# Patient Record
Sex: MALE | Race: WHITE | Employment: OTHER | URBAN - METROPOLITAN AREA
[De-identification: names, ages, dates, MRNs, and addresses within clinical notes are randomized per-mention and may not be internally consistent; named-entity substitution may affect disease eponyms.]

---

## 2023-02-13 PROBLEM — R06.02 SHORTNESS OF BREATH: Status: ACTIVE | Noted: 2023-02-13

## 2023-02-13 PROBLEM — M25.50 ARTHRALGIA OF MULTIPLE SITES: Status: ACTIVE | Noted: 2023-02-13

## 2023-02-13 PROBLEM — F41.1 GENERALIZED ANXIETY DISORDER: Status: ACTIVE | Noted: 2023-02-13

## 2023-02-13 PROBLEM — K76.0 FATTY INFILTRATION OF LIVER: Status: ACTIVE | Noted: 2023-02-13

## 2023-02-13 PROBLEM — J02.9 PHARYNGITIS: Status: ACTIVE | Noted: 2023-02-13

## 2023-02-13 PROBLEM — R00.2 PALPITATIONS: Status: ACTIVE | Noted: 2023-02-13

## 2023-02-13 PROBLEM — M54.9 BACK PAIN: Status: ACTIVE | Noted: 2023-02-13

## 2023-02-13 PROBLEM — F41.9 ANXIETY: Status: ACTIVE | Noted: 2023-02-13

## 2023-02-13 PROBLEM — R07.89 ATYPICAL CHEST PAIN: Status: ACTIVE | Noted: 2023-02-13

## 2023-02-13 PROBLEM — I47.20 VENTRICULAR TACHYCARDIA (PAROXYSMAL): Status: ACTIVE | Noted: 2023-02-13

## 2023-02-13 PROBLEM — F32.A DEPRESSION: Status: ACTIVE | Noted: 2023-02-13

## 2023-02-13 PROBLEM — R53.83 FATIGUE: Status: ACTIVE | Noted: 2023-02-13

## 2023-02-13 PROBLEM — R51.9 HEADACHE: Status: ACTIVE | Noted: 2023-02-13

## 2023-02-13 PROBLEM — I47.29 VENTRICULAR TACHYCARDIA (PAROXYSMAL) (MULTI): Status: ACTIVE | Noted: 2023-02-13

## 2023-02-13 PROBLEM — R14.0 ABDOMINAL BLOATING: Status: ACTIVE | Noted: 2023-02-13

## 2023-02-13 PROBLEM — K21.9 GERD (GASTROESOPHAGEAL REFLUX DISEASE): Status: ACTIVE | Noted: 2023-02-13

## 2023-02-13 PROBLEM — F32.1 DEPRESSION, MAJOR, SINGLE EPISODE, MODERATE (MULTI): Status: ACTIVE | Noted: 2023-02-13

## 2023-02-13 PROBLEM — R10.9 ABDOMINAL PAIN: Status: ACTIVE | Noted: 2023-02-13

## 2023-02-13 PROBLEM — L03.90 CELLULITIS: Status: ACTIVE | Noted: 2023-02-13

## 2023-02-13 PROBLEM — U07.1 COVID-19 VIRUS INFECTION: Status: ACTIVE | Noted: 2023-02-13

## 2023-02-13 PROBLEM — R91.1 PULMONARY NODULE: Status: ACTIVE | Noted: 2023-02-13

## 2023-02-13 PROBLEM — I51.89 FAMILIAL HEART DISEASE: Status: ACTIVE | Noted: 2023-02-13

## 2023-02-13 PROBLEM — R19.8 ALTERNATING CONSTIPATION AND DIARRHEA: Status: ACTIVE | Noted: 2023-02-13

## 2023-02-13 RX ORDER — VENLAFAXINE HYDROCHLORIDE 150 MG/1
150 CAPSULE, EXTENDED RELEASE ORAL DAILY
COMMUNITY
Start: 2022-07-07 | End: 2023-07-19

## 2023-02-13 RX ORDER — METOPROLOL TARTRATE 50 MG/1
50 TABLET ORAL 2 TIMES DAILY
COMMUNITY
Start: 2017-09-22 | End: 2023-10-26

## 2023-02-13 RX ORDER — BUSPIRONE HYDROCHLORIDE 15 MG/1
15 TABLET ORAL 2 TIMES DAILY
COMMUNITY
End: 2023-04-14

## 2023-03-24 ENCOUNTER — APPOINTMENT (OUTPATIENT)
Dept: PRIMARY CARE | Facility: CLINIC | Age: 53
End: 2023-03-24
Payer: COMMERCIAL

## 2023-04-14 DIAGNOSIS — F41.9 ANXIETY DISORDER, UNSPECIFIED: ICD-10-CM

## 2023-04-14 RX ORDER — BUSPIRONE HYDROCHLORIDE 15 MG/1
TABLET ORAL
Qty: 180 TABLET | Refills: 3 | Status: SHIPPED | OUTPATIENT
Start: 2023-04-14 | End: 2023-11-01 | Stop reason: ALTCHOICE

## 2023-07-19 DIAGNOSIS — F32.1 MAJOR DEPRESSIVE DISORDER, SINGLE EPISODE, MODERATE (MULTI): ICD-10-CM

## 2023-07-19 RX ORDER — VENLAFAXINE HYDROCHLORIDE 75 MG/1
75 CAPSULE, EXTENDED RELEASE ORAL DAILY
Qty: 90 CAPSULE | Refills: 3 | Status: SHIPPED | OUTPATIENT
Start: 2023-07-19 | End: 2024-04-22

## 2023-07-19 RX ORDER — VENLAFAXINE HYDROCHLORIDE 150 MG/1
CAPSULE, EXTENDED RELEASE ORAL
Qty: 90 CAPSULE | Refills: 3 | Status: SHIPPED | OUTPATIENT
Start: 2023-07-19 | End: 2023-07-19 | Stop reason: ALTCHOICE

## 2023-07-19 RX ORDER — VENLAFAXINE HYDROCHLORIDE 75 MG/1
75 CAPSULE, EXTENDED RELEASE ORAL DAILY
COMMUNITY
End: 2023-07-19 | Stop reason: SDUPTHER

## 2023-09-19 RX ORDER — NEOMYCIN SULFATE, POLYMYXIN B SULFATE AND DEXAMETHASONE 3.5; 10000; 1 MG/ML; [USP'U]/ML; MG/ML
SUSPENSION/ DROPS OPHTHALMIC
COMMUNITY
Start: 2023-06-01 | End: 2023-11-01 | Stop reason: ALTCHOICE

## 2023-10-26 DIAGNOSIS — R00.2 PALPITATIONS: ICD-10-CM

## 2023-10-26 RX ORDER — METOPROLOL TARTRATE 50 MG/1
TABLET ORAL
Qty: 270 TABLET | Refills: 3 | Status: SHIPPED | OUTPATIENT
Start: 2023-10-26 | End: 2024-05-08 | Stop reason: ALTCHOICE

## 2023-11-01 ENCOUNTER — OFFICE VISIT (OUTPATIENT)
Dept: CARDIOLOGY | Facility: CLINIC | Age: 53
End: 2023-11-01
Payer: COMMERCIAL

## 2023-11-01 VITALS
HEIGHT: 70 IN | HEART RATE: 66 BPM | RESPIRATION RATE: 16 BRPM | BODY MASS INDEX: 32.07 KG/M2 | DIASTOLIC BLOOD PRESSURE: 84 MMHG | OXYGEN SATURATION: 97 % | SYSTOLIC BLOOD PRESSURE: 116 MMHG | WEIGHT: 224 LBS

## 2023-11-01 DIAGNOSIS — R00.2 PALPITATIONS: Primary | ICD-10-CM

## 2023-11-01 DIAGNOSIS — I47.29 VENTRICULAR TACHYCARDIA (PAROXYSMAL) (MULTI): ICD-10-CM

## 2023-11-01 DIAGNOSIS — I51.89 FAMILIAL HEART DISEASE: ICD-10-CM

## 2023-11-01 PROCEDURE — 99213 OFFICE O/P EST LOW 20 MIN: CPT | Performed by: INTERNAL MEDICINE

## 2023-11-01 PROCEDURE — 93005 ELECTROCARDIOGRAM TRACING: CPT | Performed by: INTERNAL MEDICINE

## 2023-11-01 PROCEDURE — 93010 ELECTROCARDIOGRAM REPORT: CPT | Performed by: INTERNAL MEDICINE

## 2023-11-01 PROCEDURE — 1036F TOBACCO NON-USER: CPT | Performed by: INTERNAL MEDICINE

## 2023-11-01 ASSESSMENT — PATIENT HEALTH QUESTIONNAIRE - PHQ9
1. LITTLE INTEREST OR PLEASURE IN DOING THINGS: NOT AT ALL
SUM OF ALL RESPONSES TO PHQ9 QUESTIONS 1 AND 2: 0
2. FEELING DOWN, DEPRESSED OR HOPELESS: NOT AT ALL

## 2023-11-01 ASSESSMENT — COLUMBIA-SUICIDE SEVERITY RATING SCALE - C-SSRS
1. IN THE PAST MONTH, HAVE YOU WISHED YOU WERE DEAD OR WISHED YOU COULD GO TO SLEEP AND NOT WAKE UP?: NO
6. HAVE YOU EVER DONE ANYTHING, STARTED TO DO ANYTHING, OR PREPARED TO DO ANYTHING TO END YOUR LIFE?: NO
2. HAVE YOU ACTUALLY HAD ANY THOUGHTS OF KILLING YOURSELF?: NO

## 2023-11-01 NOTE — PROGRESS NOTES
Subjective   Rj Mejia was last seen in the [facility] for follow up of palpitations and paroxysmal ventricular tachycardia in April 2023.      Since our last visit, Mr. Mejia has only stable frequency of palpitations. Short episodes almost daily at night controlled with using second metoprolol 50 mg tablet as needed at night. Rare episodes of palpitations that last for 1-5 seconds from paroxysmal VT episodes about 1 per 2 months. He is taking metoprolol as 50 mg AM and 100 mg PM. Episodes occur mostly at night and are better suppressed with 100 mg in the evening. Palpitation episodes not limited activity or daily living routine.  mg doses cause notable dyspnea. Notes more day time fatigue now and decreased exercise schedule.     Prior symptom baseline at 4/2021 visit was symptoms 3-4 times a week. No lightheadedness or presyncope. No chest pain, PND, orthopnea, DORENE, claudication. Some episodes of dyspnea with walking up the stairs. Less aerobic exercise frequency in the last 6 months. Has not had episodes of orthostatic lightheadedness with standing in the last 6 months with more oral hydration.     He was hospitalized at [facility] 9/19-9/22/2017 for finding of sustained ventricular tachycardia and 6 second sinus pause on ZIoPatch monitor. Structural heart reason for VT excluded by CMRI, CCTA.      Past Medical History:  1. Depression on SSRI  2. Palpitations / paroxysmal VT     Family History:  Father had CABG in his 60s and AVR for bicuspid AV     Social History:  Non-smoker     Review of Systems    A 14 point review of systems was asked. All questions were negative except for pertinent positives listed in the HPI.      Objective   Physical Exam  BP Readings from Last 3 Encounters:   11/01/23 116/84   04/26/23 107/72   10/12/22 108/73      Wt Readings from Last 3 Encounters:   11/01/23 102 kg (224 lb)   04/26/23 99.7 kg (219 lb 12.8 oz)   12/22/22 93 kg (205 lb)      BMI:  "Estimated body mass index is 32.14 kg/m² as calculated from the following:    Height as of this encounter: 1.778 m (5' 10\").    Weight as of this encounter: 102 kg (224 lb).  BSA: Estimated body surface area is 2.24 meters squared as calculated from the following:    Height as of this encounter: 1.778 m (5' 10\").    Weight as of this encounter: 102 kg (224 lb).    General: no acute distress  HEENT: EOMI, no scleral icterus.  Lungs: Clear to auscultation bilaterally without wheezing, rales, or rhonchi.  Cardiovascular: Regular rhythm and rate. Normal S1 and S2. No murmurs, rubs, or gallops are appreciated. JVP normal.  Abdomen: Soft, nontender, nondistended. Bowel sounds present.  Extremities: Warm and well perfused with equal 2+ pulses bilaterally.  No edema present.  Neurologic: Alert and oriented x3.    I have personally reviewed the following images and laboratory findings:  Last echocardiogram: 2023 echo: LV 65-70%, LV conc remodeling (LVMI 83 gm/m2), impaired relaxation diastology (E/e' 6), normal LA size (SPENCER 21 ml/m2), normal RV/RA, trivial AI, trace MR, trace TR, RVSP 20 mm Hg (RAP 3 mm Hg).    Prior echo, 2017: LV EF 65-70%, no LVH, impaired relaxation diastology (E/e' 4), normal LA (SPENCER 20), normal RV/RA, no AI, mild MAC, trace MR, trace TR, unable to estimate RVSP.     Cardiac MRI, 2017: LV EF 55-60%, normal RV function and morphology with LGE enhancement or fatty infiltration. Normal LV ECV and no LGE.     Last cath / stress test: Coronary CTA 2017: LMCA: nl, LAD: focal mid mild plaque, LCX: nl, RI: nl, RCA: dominant, normal.    Most recent EC2023 ECG: Sinus bradycardia, 58 bpm, borderline LVH pattern. Normal variant ECG. Personally reviewed in office.     Assessment/Plan   1. Palpitations:  Due to PVCs and rare paroxysmal VT without evidence of structural heart disease on detail evaluation in 2017. Continue metoprolol 50 mg AM and cut back to 50 mg PM mg PM to see if this " helps fatigue. Can take PRN PM 50 mg 2nd table for palpitation suppression as this is helping his symptomatology now. 2022 repeat 7 day HR monitor showed stable PVC burden of 0.5% with rate short NSVT episodes controlled on beta-blocker therapy.     For recent exertional dyspnea episodes and ongoing palpitations from PVCs/paroxysmal VT, repeat echocardiogram in May 2023 showed no cardiac structural changes and was unchanged from the 2017 study.      2. Family History of CAD:  Low CACS (29), and patent coronary arteries on 9/21/2017 CCTA. No indication for statin therapy at this time. March 2021 lipid panel showed decrease in LDL to 95 from 131 in 2019. January 2023 LDL stable at 96. Long term LDL goal < 100 based on current risk factor profile.     Follow up with Dr. Cruz in 6 months.            SIGNATURE: Wallace Cruz MD PATIENT NAME: Rj Mejia   DATE/TIME: November 1, 2023 10:53 AM MRN: [id number]

## 2023-11-01 NOTE — PATIENT INSTRUCTIONS
You were seen in the [facility] for your palpitations and ventricular tachycardia.     For your shortness of breath with activity such as climbing the stairs and ongoing palpitations from PVCs,    Continue your metoprolol as 50 mg in the morning and cut back to 50 mg (1 50 mg tablet) at night.     We will see if this helps your fatigue feeling. I would also take your Effexor-XR at night.     You can take an extra dose of metoprolol 50 mg at night for breakthrough palpitation episodes.     Your April-May 2022 repeat the 7 day heart rate monitor showed that PVCs were 0.5% of your total heart beats and that you had 5 episodes of 5-20 beats of the ventricular tachycardia which were similar in frequency of extra heart beats and compare to your prior 2019 monitor. Your May 2023 Echocardiogram (ultrasound of the heart) showed normal heart structure and function with no changes from 2017.     Your April 2019 heart rate monitor (Life MetricsoPatch) showed only very rare extra heart beats from the ventricles (< 1% total heart beats were PVCs) with 1 episode of 8 consecutive PVCs together. Average resting heart rate was 67 bpm. This monitor supports us continuing your current metoprolol dose.     Your September 2017 coronary CT showed no significant hardening of the heart arteries. We do not need to start a cholesterol medicine at this time. Your January 2023 LDL (bad) cholesterol was 96 and is down from 131 in 2019. Based on your current risk factors, your long term LDL goal is < 100. We do not need to use cholesterol medications at this time.      Drink 2 liters of fluids a day to prevent lightheadedness with standing. Drink 16-20 ounces first in the morning. Keep a large glass of water at your bedside.     Follow up with Dr. Cruz in 6 months.

## 2023-11-10 LAB
ATRIAL RATE: 58 BPM
P AXIS: 15 DEGREES
P OFFSET: 194 MS
P ONSET: 140 MS
PR INTERVAL: 172 MS
Q ONSET: 226 MS
QRS COUNT: 10 BEATS
QRS DURATION: 88 MS
QT INTERVAL: 416 MS
QTC CALCULATION(BAZETT): 408 MS
QTC FREDERICIA: 411 MS
R AXIS: -20 DEGREES
T AXIS: -3 DEGREES
T OFFSET: 434 MS
VENTRICULAR RATE: 58 BPM

## 2024-02-14 ENCOUNTER — TELEMEDICINE (OUTPATIENT)
Dept: PRIMARY CARE | Facility: CLINIC | Age: 54
End: 2024-02-14
Payer: COMMERCIAL

## 2024-02-14 DIAGNOSIS — U07.1 COVID-19: Primary | ICD-10-CM

## 2024-02-14 DIAGNOSIS — Z00.00 HEALTHCARE MAINTENANCE: ICD-10-CM

## 2024-02-14 PROCEDURE — 99213 OFFICE O/P EST LOW 20 MIN: CPT | Performed by: STUDENT IN AN ORGANIZED HEALTH CARE EDUCATION/TRAINING PROGRAM

## 2024-02-14 PROCEDURE — 1036F TOBACCO NON-USER: CPT | Performed by: STUDENT IN AN ORGANIZED HEALTH CARE EDUCATION/TRAINING PROGRAM

## 2024-02-14 ASSESSMENT — ENCOUNTER SYMPTOMS
FEVER: 0
RHINORRHEA: 1
SHORTNESS OF BREATH: 0
WEAKNESS: 0
LIGHT-HEADEDNESS: 1
SINUS PAIN: 0
CHILLS: 1
SORE THROAT: 0
SINUS PRESSURE: 1
VOMITING: 0
DIAPHORESIS: 1
ABDOMINAL PAIN: 0
WHEEZING: 0
NUMBNESS: 0
NAUSEA: 0
DIZZINESS: 0
DIARRHEA: 0

## 2024-02-14 NOTE — PROGRESS NOTES
Subjective   Patient ID: Rj Mejia is a 53 y.o. male who presents for covid.  Virtual or Telephone Consent    An interactive audio and video telecommunication system which permits real time communications between the patient (at the originating site) and provider (at the distant site) was utilized to provide this telehealth service.   Verbal consent was requested and obtained from Rj Mejia on this date, 02/14/24 for a telehealth visit.     Pt started having symptoms on Monday and tested positive for covid yesterday. Pt has sx of maxillary sinus congestion, cough-initially was dry cough, minimal mucus production now, headache on Monday but intermittent and better with aleve, and body aches. Takes aleve intermittently PRN. Also using nyquil. He reports drinking lots of fluids.     PMH is significant for BMI 32, 30 liver, palpitations, anxiety/depression.  GFR 88 in January 2023.        Review of Systems   Constitutional:  Positive for chills (resolved) and diaphoresis (better). Negative for fever.   HENT:  Positive for postnasal drip, rhinorrhea and sinus pressure. Negative for ear discharge, ear pain, hearing loss, sinus pain and sore throat.    Eyes:  Negative for visual disturbance.   Respiratory:  Negative for shortness of breath and wheezing.    Cardiovascular:  Negative for chest pain.   Gastrointestinal:  Negative for abdominal pain, diarrhea, nausea and vomiting.   Neurological:  Positive for light-headedness. Negative for dizziness, syncope, weakness and numbness.       There were no vitals taken for this visit.    Objective   Physical Exam  Constitutional:       General: He is not in acute distress.  Pulmonary:      Effort: No respiratory distress.   Neurological:      Mental Status: He is alert.         Assessment/Plan   Problem List Items Addressed This Visit       COVID-19 - Primary    Relevant Medications    nirmatrelvir-ritonavir (PAXLOVID) 300 mg (150 mg x 2)-100 mg tablet therapy pack      Other Visit Diagnoses       Healthcare maintenance        Relevant Orders    Follow Up In [facility] - PCP - Health Maintenance              COVID-19  - Symptoms largely consistent with COVID-19 given symptoms and positive COVID test.  -Discussed he is at higher risk of complications from COVID including BMI elevation, anxiety/depression, and would likely benefit from Paxlovid.  Discussed goal would be to keep him out of the hospital.  Discussed common side effects of Paxlovid including altered taste and diarrhea and rare chance of side effects such as severe skin reactions, allergic reactions, syncope, cardiac side effects.   -Discussed to try Flonase 1 to 2 sprays in each nostril for 2 weeks to help with sinus symptoms, rare chance of nosebleeds and let us know if these develop.  Also advised the followin).  Watch breathing closely- if shortness of breath develops or pulse ox drops <90%, need to go to the emergency room  2).  Push fluids and get plenty of rest  3).  Can use Tylenol as needed for body aches / fevers  4).  Should quarantine from others for 10 days from start of symptoms or after 5 days and afebrile can leave quarantine with a mask  5).  No need to retest after 10 days are up    Follow-up with me as needed  Follow-up with Dr. Barragan for physical at earliest convenience.

## 2024-03-12 ENCOUNTER — TELEPHONE (OUTPATIENT)
Dept: PRIMARY CARE | Facility: CLINIC | Age: 54
End: 2024-03-12
Payer: COMMERCIAL

## 2024-03-15 ENCOUNTER — TELEPHONE (OUTPATIENT)
Dept: PRIMARY CARE | Facility: CLINIC | Age: 54
End: 2024-03-15
Payer: COMMERCIAL

## 2024-03-15 NOTE — TELEPHONE ENCOUNTER
----- Message from Briana Kimble sent at 3/11/2024  3:31 PM EDT -----  Can you call patient to schedule please  ----- Message -----  From: Lorenzo Bustillos DO  Sent: 2/14/2024   1:28 PM EDT  To:  [id number] [place] [id number] Clerical    Please call patient to set up physical with Dr. Barragan at earliest convenience.

## 2024-04-21 DIAGNOSIS — F32.1 MAJOR DEPRESSIVE DISORDER, SINGLE EPISODE, MODERATE (MULTI): ICD-10-CM

## 2024-04-22 RX ORDER — VENLAFAXINE HYDROCHLORIDE 75 MG/1
75 CAPSULE, EXTENDED RELEASE ORAL DAILY
Qty: 90 CAPSULE | Refills: 3 | Status: SHIPPED | OUTPATIENT
Start: 2024-04-22

## 2024-05-08 ENCOUNTER — OFFICE VISIT (OUTPATIENT)
Dept: CARDIOLOGY | Facility: CLINIC | Age: 54
End: 2024-05-08
Payer: COMMERCIAL

## 2024-05-08 VITALS
WEIGHT: 221 LBS | HEART RATE: 63 BPM | SYSTOLIC BLOOD PRESSURE: 104 MMHG | HEIGHT: 69 IN | BODY MASS INDEX: 32.73 KG/M2 | OXYGEN SATURATION: 96 % | DIASTOLIC BLOOD PRESSURE: 76 MMHG

## 2024-05-08 DIAGNOSIS — I51.89 FAMILIAL HEART DISEASE: ICD-10-CM

## 2024-05-08 DIAGNOSIS — R00.2 PALPITATIONS: Primary | ICD-10-CM

## 2024-05-08 DIAGNOSIS — I47.29 VENTRICULAR TACHYCARDIA (PAROXYSMAL) (MULTI): ICD-10-CM

## 2024-05-08 LAB
ATRIAL RATE: 63 BPM
P AXIS: 46 DEGREES
P OFFSET: 199 MS
P ONSET: 140 MS
PR INTERVAL: 168 MS
Q ONSET: 224 MS
QRS COUNT: 10 BEATS
QRS DURATION: 90 MS
QT INTERVAL: 416 MS
QTC CALCULATION(BAZETT): 425 MS
QTC FREDERICIA: 422 MS
R AXIS: -1 DEGREES
T AXIS: 8 DEGREES
T OFFSET: 432 MS
VENTRICULAR RATE: 63 BPM

## 2024-05-08 PROCEDURE — 99214 OFFICE O/P EST MOD 30 MIN: CPT | Performed by: INTERNAL MEDICINE

## 2024-05-08 PROCEDURE — 93010 ELECTROCARDIOGRAM REPORT: CPT | Performed by: INTERNAL MEDICINE

## 2024-05-08 PROCEDURE — 1036F TOBACCO NON-USER: CPT | Performed by: INTERNAL MEDICINE

## 2024-05-08 PROCEDURE — 93005 ELECTROCARDIOGRAM TRACING: CPT | Performed by: INTERNAL MEDICINE

## 2024-05-08 RX ORDER — METOPROLOL SUCCINATE 50 MG/1
50 TABLET, EXTENDED RELEASE ORAL DAILY
Qty: 90 TABLET | Refills: 3 | Status: SHIPPED | OUTPATIENT
Start: 2024-05-08 | End: 2025-05-08

## 2024-05-08 ASSESSMENT — ENCOUNTER SYMPTOMS
OCCASIONAL FEELINGS OF UNSTEADINESS: 0
LOSS OF SENSATION IN FEET: 0
DEPRESSION: 0

## 2024-05-08 ASSESSMENT — PAIN SCALES - GENERAL: PAINLEVEL: 0-NO PAIN

## 2024-05-08 NOTE — PROGRESS NOTES
Subjective   Rj Mejia was last seen in the [facility] for follow up of palpitations and paroxysmal ventricular tachycardia in November 2023.      Since our last visit, Mr. Mejia has stable frequency of palpitations. Short episodes a few days a week at night. Not using extra 50 mg tablet at night in the last 3 months. Notes increase daytime fatigue.     Rare episodes of palpitations that last for 1-5 seconds from paroxysmal VT episodes about 1 per 2 months. Prior use of metoprolol  mg doses caused notable dyspnea. Notes more day time fatigue now and decreased exercise schedule.     Prior symptom baseline at 4/2021 visit was symptoms 3-4 times a week. No lightheadedness or presyncope. No chest pain, PND, orthopnea, DORENE, claudication. Some episodes of dyspnea with walking up the stairs. Less aerobic exercise frequency in the last 6 months. Has not had episodes of orthostatic lightheadedness with standing in the last 6 months with more oral hydration.     He was hospitalized at [facility] 9/19-9/22/2017 for finding of sustained ventricular tachycardia and 6 second sinus pause on ZIoPatch monitor. Structural heart reason for VT excluded by CMRI, CCTA.      Past Medical History:  1. Depression on SSRI  2. Palpitations / paroxysmal VT     Family History:  Father had CABG in his 60s and AVR for bicuspid AV     Social History:  Non-smoker     Review of Systems    A 14 point review of systems was asked. All questions were negative except for pertinent positives listed in the HPI.     Current Outpatient Medications on File Prior to Visit   Medication Sig Dispense Refill    metoprolol tartrate (Lopressor) 50 mg tablet TAKE 1 TABLET TWICE DAILY YOU MAY ALSO TAKE AN ADDITIONAL 50 MG TABLET DOSE AS NEEDED FOR BREAKTHROUGH PALPITATION SYMTOMS. 270 tablet 3    venlafaxine XR (Effexor-XR) 75 mg 24 hr capsule TAKE 1 CAPSULE BY MOUTH EVERY DAY 90 capsule 3     No current facility-administered  "medications on file prior to visit.      Objective   Physical Exam  BP Readings from Last 3 Encounters:   24 104/76   23 116/84   23 107/72      Wt Readings from Last 3 Encounters:   24 100 kg (221 lb)   23 102 kg (224 lb)   23 99.7 kg (219 lb 12.8 oz)      BMI: Estimated body mass index is 32.64 kg/m² as calculated from the following:    Height as of this encounter: 1.753 m (5' 9\").    Weight as of this encounter: 100 kg (221 lb).  BSA: Estimated body surface area is 2.21 meters squared as calculated from the following:    Height as of this encounter: 1.753 m (5' 9\").    Weight as of this encounter: 100 kg (221 lb).    General: no acute distress  HEENT: EOMI, no scleral icterus.  Lungs: Clear to auscultation bilaterally without wheezing, rales, or rhonchi.  Cardiovascular: Regular rhythm and rate. Normal S1 and S2. No murmurs, rubs, or gallops are appreciated. JVP normal.  Abdomen: Soft, nontender, nondistended. Bowel sounds present.  Extremities: Warm and well perfused with equal 2+ pulses bilaterally.  No edema present.  Neurologic: Alert and oriented x3.    I have personally reviewed the following images and laboratory findings:  Last echocardiogram:   2023 echo: LV 65-70%, LV conc remodeling (LVMI 83 gm/m2), impaired relaxation diastology (E/e' 6), normal LA size (SPENCER 21 ml/m2), normal RV/RA, trivial AI, trace MR, trace TR, RVSP 20 mm Hg (RAP 3 mm Hg).    Prior echo, 2017: LV EF 65-70%, no LVH, impaired relaxation diastology (E/e' 4), normal LA (SPENCER 20), normal RV/RA, no AI, mild MAC, trace MR, trace TR, unable to estimate RVSP.     Cardiac MRI, 2017: LV EF 55-60%, normal RV function and morphology with LGE enhancement or fatty infiltration. Normal LV ECV and no LGE.     Last cath / stress test:   Coronary CTA 2017: LMCA: nl, LAD: focal mid mild plaque, LCX: nl, RI: nl, RCA: dominant, normal.    Most recent EC2024 ECG: Sinus rhythm, 63 bpm, " "normal ECG. Personally reviewed in office.    11/1/2023 ECG: Sinus bradycardia, 58 bpm, borderline LVH pattern. Normal variant ECG. Personally reviewed in office.    Lab Results   Component Value Date    CHOL 170 01/18/2023    CHOL 158 03/15/2022    CHOL 169 03/15/2021     Lab Results   Component Value Date    HDL 38.0 (A) 01/18/2023    HDL 38.0 (A) 03/15/2022    HDL 40.0 03/15/2021     No results found for: \"LDLCALC\"  Lab Results   Component Value Date    TRIG 182 (H) 01/18/2023    TRIG 122 03/15/2022    TRIG 170 (H) 03/15/2021     No components found for: \"CHOLHDL\"      Assessment/Plan   1. Palpitations:  Due to PVCs and rare paroxysmal VT without evidence of structural heart disease on detail evaluation in 2017. 2022 repeat 7 day HR monitor showed stable PVC burden of 0.5% with rate short NSVT episodes controlled on beta-blocker therapy.     For fatigue / exertional dyspnea episodes and ongoing palpitations from PVCs/paroxysmal VT, repeat echocardiogram in May 2023 showed no cardiac structural changes and was unchanged from the 2017 study.     I will switch metoprolol to extended release 50 mg tablets once a day to be taken at night. He can use his current metoprolol tartrate 50 mg tablets for PRN for breakthrough palpitations to see if this reduced daily dose improve dyspnea / fatigue without increasing palpitations.     2. Family History of CAD:  Low CACS (29), and patent coronary arteries on 9/21/2017 CCTA. No indication for statin therapy at this time. March 2021 lipid panel showed decrease in LDL to 95 from 131 in 2019. January 2023 LDL stable at 96. Long term LDL goal < 100 based on current risk factor profile.     Follow up with Dr. Cruz in 6 months.            SIGNATURE: Wallace Cruz MD PATIENT NAME: Rj Mejia   DATE/TIME: May 8, 2024 9:47 AM MRN: [id number]     "

## 2024-05-08 NOTE — PATIENT INSTRUCTIONS
You were seen in the [facility] for your palpitations and ventricular tachycardia.     I am switching your metoprolol tablets to metoprolol ER (extended release) 50 mg once a day. I recommend you take at night.     We will see if this helps your fatigue feeling. You can take an extra dose of metoprolol tartrate 50 mg tablets you have now for breakthrough palpitations.      Your April-May 2022 repeat the 7 day heart rate monitor showed that PVCs were 0.5% of your total heart beats and that you had 5 episodes of 5-20 beats of the ventricular tachycardia which were similar in frequency of extra heart beats and compare to your prior 2019 monitor. Your May 2023 Echocardiogram (ultrasound of the heart) showed normal heart structure and function with no changes from 2017.     Your April 2019 heart rate monitor ([employer]Patch) showed only very rare extra heart beats from the ventricles (< 1% total heart beats were PVCs) with 1 episode of 8 consecutive PVCs together. Average resting heart rate was 67 bpm. This monitor supports us continuing your current metoprolol dose.     Your September 2017 coronary CT showed no significant hardening of the heart arteries. We do not need to start a cholesterol medicine at this time. Your January 2023 LDL (bad) cholesterol was 96 and is down from 131 in 2019. Based on your current risk factors, your long term LDL goal is < 100. We do not need to use cholesterol medications at this time.     Your 2023 Echocardiogram (ultrasound of the heart) showed normal heart structure and function with no changes from 2017.     Drink 2 liters of fluids a day to prevent lightheadedness with standing. Drink 16-20 ounces first in the morning. Keep a large glass of water at your bedside.     Follow up with Dr. Cruz in 6 months.

## 2024-06-20 ENCOUNTER — TELEPHONE (OUTPATIENT)
Dept: CARDIOLOGY | Facility: CLINIC | Age: 54
End: 2024-06-20
Payer: COMMERCIAL

## 2024-06-20 DIAGNOSIS — R00.2 PALPITATIONS: ICD-10-CM

## 2024-06-20 DIAGNOSIS — I47.29 VENTRICULAR TACHYCARDIA (PAROXYSMAL) (MULTI): ICD-10-CM

## 2024-06-20 RX ORDER — METOPROLOL SUCCINATE 100 MG/1
100 TABLET, EXTENDED RELEASE ORAL DAILY
Qty: 90 TABLET | Refills: 3 | Status: SHIPPED | OUTPATIENT
Start: 2024-06-20 | End: 2024-06-21 | Stop reason: SDUPTHER

## 2024-06-20 NOTE — TELEPHONE ENCOUNTER
"Patient contacted cardiology nursing office regarding concern of medication.  Patient provided update Dr Cruz switched patient from metoprolol tartrate to metoprolol succinate due to patient feeling tired.  Patient reports that after the medication switch patient does not feel as tired but is concerned that the medication may not be controlling \"the heart beat\" due to feeling extra beats.  Patient does not have a device to check heart rate so unable to report heart rates.  Patient requesting increase to medication dose due to symptoms. Preferred pharmacy confirmed as [facility] [address] in [place].   "

## 2024-06-21 DIAGNOSIS — R00.2 PALPITATIONS: ICD-10-CM

## 2024-06-21 DIAGNOSIS — I47.29 VENTRICULAR TACHYCARDIA (PAROXYSMAL) (MULTI): ICD-10-CM

## 2024-06-21 RX ORDER — METOPROLOL SUCCINATE 100 MG/1
100 TABLET, EXTENDED RELEASE ORAL DAILY
Qty: 90 TABLET | Refills: 3 | Status: SHIPPED | OUTPATIENT
Start: 2024-06-21 | End: 2025-06-21

## 2024-06-21 RX ORDER — METOPROLOL SUCCINATE 100 MG/1
100 TABLET, EXTENDED RELEASE ORAL DAILY
Qty: 90 TABLET | Refills: 3 | Status: CANCELLED | OUTPATIENT
Start: 2024-06-21 | End: 2025-06-21

## 2024-07-22 ENCOUNTER — TELEPHONE (OUTPATIENT)
Dept: CARDIOLOGY | Facility: CLINIC | Age: 54
End: 2024-07-22
Payer: COMMERCIAL

## 2024-07-22 NOTE — TELEPHONE ENCOUNTER
Patient called to state that his Extended release of Metoprolol didn't seem to work for him. About a week ago he changed back to his original dose of Metoprolol IC tartrate 50 mg tablet 2-3 x a day.  It has seemed to help with his palpitations.     He would like to know whether he should increase his dose and or frequency of the tartrate?

## 2024-07-31 DIAGNOSIS — R00.2 PALPITATIONS: Primary | ICD-10-CM

## 2024-07-31 DIAGNOSIS — I47.29 VENTRICULAR TACHYCARDIA (PAROXYSMAL) (MULTI): ICD-10-CM

## 2024-07-31 RX ORDER — METOPROLOL TARTRATE 50 MG/1
50 TABLET ORAL 2 TIMES DAILY
Qty: 270 TABLET | Refills: 3 | Status: SHIPPED | OUTPATIENT
Start: 2024-07-31 | End: 2025-07-31

## 2024-11-06 ENCOUNTER — OFFICE VISIT (OUTPATIENT)
Dept: CARDIOLOGY | Facility: CLINIC | Age: 54
End: 2024-11-06
Payer: COMMERCIAL

## 2024-11-06 ENCOUNTER — LAB (OUTPATIENT)
Dept: LAB | Facility: LAB | Age: 54
End: 2024-11-06
Payer: COMMERCIAL

## 2024-11-06 VITALS
DIASTOLIC BLOOD PRESSURE: 79 MMHG | HEART RATE: 65 BPM | OXYGEN SATURATION: 94 % | WEIGHT: 219 LBS | HEIGHT: 69 IN | SYSTOLIC BLOOD PRESSURE: 111 MMHG | BODY MASS INDEX: 32.44 KG/M2

## 2024-11-06 DIAGNOSIS — I51.89 FAMILIAL HEART DISEASE: ICD-10-CM

## 2024-11-06 DIAGNOSIS — R00.2 PALPITATIONS: Primary | ICD-10-CM

## 2024-11-06 LAB
CHOLEST SERPL-MCNC: 199 MG/DL (ref 0–199)
CHOLESTEROL/HDL RATIO: 5
HDLC SERPL-MCNC: 39.9 MG/DL
LDLC SERPL CALC-MCNC: 112 MG/DL
NON HDL CHOLESTEROL: 159 MG/DL (ref 0–149)
TRIGL SERPL-MCNC: 234 MG/DL (ref 0–149)
VLDL: 47 MG/DL (ref 0–40)

## 2024-11-06 PROCEDURE — 99213 OFFICE O/P EST LOW 20 MIN: CPT | Performed by: INTERNAL MEDICINE

## 2024-11-06 PROCEDURE — 80061 LIPID PANEL: CPT

## 2024-11-06 PROCEDURE — 3008F BODY MASS INDEX DOCD: CPT | Performed by: INTERNAL MEDICINE

## 2024-11-06 PROCEDURE — 1036F TOBACCO NON-USER: CPT | Performed by: INTERNAL MEDICINE

## 2024-11-06 PROCEDURE — 82172 ASSAY OF APOLIPOPROTEIN: CPT

## 2024-11-06 PROCEDURE — 36415 COLL VENOUS BLD VENIPUNCTURE: CPT

## 2024-11-06 ASSESSMENT — PAIN SCALES - GENERAL: PAINLEVEL_OUTOF10: 0-NO PAIN

## 2024-11-06 ASSESSMENT — COLUMBIA-SUICIDE SEVERITY RATING SCALE - C-SSRS
2. HAVE YOU ACTUALLY HAD ANY THOUGHTS OF KILLING YOURSELF?: NO
1. IN THE PAST MONTH, HAVE YOU WISHED YOU WERE DEAD OR WISHED YOU COULD GO TO SLEEP AND NOT WAKE UP?: NO
6. HAVE YOU EVER DONE ANYTHING, STARTED TO DO ANYTHING, OR PREPARED TO DO ANYTHING TO END YOUR LIFE?: NO

## 2024-11-06 ASSESSMENT — PATIENT HEALTH QUESTIONNAIRE - PHQ9
1. LITTLE INTEREST OR PLEASURE IN DOING THINGS: NOT AT ALL
2. FEELING DOWN, DEPRESSED OR HOPELESS: NOT AT ALL
SUM OF ALL RESPONSES TO PHQ9 QUESTIONS 1 AND 2: 0

## 2024-11-06 NOTE — PROGRESS NOTES
Subjective   Rj Mejia was last seen in the [facility] for follow up of palpitations and paroxysmal ventricular tachycardia in May 2024.      Since our last visit, Mr. Mejia has stable frequency of palpitations back on metoprolol tartrate 50 mg twice a day. Short episodes a few days a week at night. Not using extra 50 mg tablet at night in the last  month. Notes stable daytime fatigue. We tried metoprolol ER but this had more fatigue and day time palpitations episodes prompting switch back to shorter acting metoprolol since our May 2024 visit.    Rare episodes of palpitations that last for 1-5 seconds from paroxysmal VT episodes about 1 per 2 months. Prior use of metoprolol  mg doses caused notable dyspnea. Notes more day time fatigue now and decreased exercise schedule.     Prior symptom baseline at 4/2021 visit was symptoms 3-4 times a week. No lightheadedness or presyncope. No chest pain, PND, orthopnea, DORENE, claudication. Some episodes of dyspnea with walking up the stairs. Less aerobic exercise frequency in the last 6 months. Has not had episodes of orthostatic lightheadedness with standing in the last 6 months with more oral hydration.     He was hospitalized at [facility] 9/19-9/22/2017 for finding of sustained ventricular tachycardia and 6 second sinus pause on ZIoPatch monitor. Structural heart reason for VT excluded by CMRI, CCTA.      Past Medical History:  1. Depression on SSRI  2. Palpitations / paroxysmal VT     Family History:  Father had CABG in his 60s and AVR for bicuspid AV     Social History:  Non-smoker     Review of Systems    A 14 point review of systems was asked. All questions were negative except for pertinent positives listed in the HPI.     Current Outpatient Medications on File Prior to Visit   Medication Sig Dispense Refill    metoprolol tartrate (Lopressor) 50 mg tablet Take 1 tablet by mouth 2 times a day. You may also take an additional 50 mg  "tablet dose as needed for breakthrough palpitation episode. 270 tablet 3    venlafaxine XR (Effexor-XR) 75 mg 24 hr capsule TAKE 1 CAPSULE BY MOUTH EVERY DAY 90 capsule 3     No current facility-administered medications on file prior to visit.      Objective   Physical Exam  BP Readings from Last 3 Encounters:   11/06/24 111/79   05/08/24 104/76   11/01/23 116/84      Wt Readings from Last 3 Encounters:   11/06/24 99.3 kg (219 lb)   05/08/24 100 kg (221 lb)   11/01/23 102 kg (224 lb)      BMI: Estimated body mass index is 32.34 kg/m² as calculated from the following:    Height as of this encounter: 1.753 m (5' 9\").    Weight as of this encounter: 99.3 kg (219 lb).  BSA: Estimated body surface area is 2.2 meters squared as calculated from the following:    Height as of this encounter: 1.753 m (5' 9\").    Weight as of this encounter: 99.3 kg (219 lb).    General: no acute distress  HEENT: EOMI, no scleral icterus.  Lungs: Clear to auscultation bilaterally without wheezing, rales, or rhonchi.  Cardiovascular: Regular rhythm and rate. Normal S1 and S2. No murmurs, rubs, or gallops are appreciated. JVP normal.  Abdomen: Soft, nontender, nondistended. Bowel sounds present.  Extremities: Warm and well perfused with equal 2+ pulses bilaterally.  No edema present.  Neurologic: Alert and oriented x3.    I have personally reviewed the following images and laboratory findings:  Last echocardiogram:   5/16/2023 echo: LV 65-70%, LV conc remodeling (LVMI 83 gm/m2), impaired relaxation diastology (E/e' 6), normal LA size (SPENCER 21 ml/m2), normal RV/RA, trivial AI, trace MR, trace TR, RVSP 20 mm Hg (RAP 3 mm Hg).    Prior echo, 9/20/2017: LV EF 65-70%, no LVH, impaired relaxation diastology (E/e' 4), normal LA (SPENCER 20), normal RV/RA, no AI, mild MAC, trace MR, trace TR, unable to estimate RVSP.     Cardiac MRI, 9/21/2017: LV EF 55-60%, normal RV function and morphology with LGE enhancement or fatty infiltration. Normal LV ECV and no " "LGE.     Last cath / stress test:   Coronary CTA 2017: LMCA: nl, LAD: focal mid mild plaque, LCX: nl, RI: nl, RCA: dominant, normal.    Most recent EC2024 ECG: Sinus rhythm, 63 bpm, normal ECG. Personally reviewed in office.    2023 ECG: Sinus bradycardia, 58 bpm, borderline LVH pattern. Normal variant ECG. Personally reviewed in office.    Lab Results   Component Value Date    CHOL 170 2023    CHOL 158 03/15/2022    CHOL 169 03/15/2021     Lab Results   Component Value Date    HDL 38.0 (A) 2023    HDL 38.0 (A) 03/15/2022    HDL 40.0 03/15/2021     No results found for: \"LDLCALC\"  Lab Results   Component Value Date    TRIG 182 (H) 2023    TRIG 122 03/15/2022    TRIG 170 (H) 03/15/2021     No components found for: \"CHOLHDL\"      Assessment/Plan   1. Palpitations:  Due to PVCs and rare paroxysmal VT without evidence of structural heart disease on detail evaluation in .  repeat 7 day HR monitor showed stable PVC burden of 0.5% with rate short NSVT episodes controlled on beta-blocker therapy.     For fatigue / exertional dyspnea episodes and ongoing palpitations from PVCs/paroxysmal VT, repeat echocardiogram in May 2023 showed no cardiac structural changes and was unchanged from the 2017 study.     Continue metoprolol tartrate 50 mg twice a day. We attempted to  switch to metoprolol succinate extended release 50 mg tablets once a day in spring 2024 but Mr. Mejia had both more fatigue symptoms and more notable palpitation episodes.     He can take an extra metoprolol tartrate 50 mg tablet PRN for breakthrough palpitations.      2. Family History of CAD:  Low CACS (29), and patent coronary arteries on 2017 CCTA. No indication for statin therapy at this time. 2021 lipid panel showed decrease in LDL to 95 from 131 in 2019. 2023 LDL stable at 96. Long term LDL goal < 100 based on current risk factor profile.    Repeat fasting lipid panel and lipoprotein A level " now to reassess borderline dyslipidemia as a CAD risk factor due to family history. R     Follow up with Dr. Cruz in 6 months.            SIGNATURE: Wallace Cruz MD PATIENT NAME: Rj Mejia   DATE/TIME: November 6, 2024 8:54 AM MRN: [id number]

## 2024-11-06 NOTE — PATIENT INSTRUCTIONS
You were seen in the [facility] for your palpitations and ventricular tachycardia.     We switched back to metoprolol tartrate 50 mg tablet twice a day as you had more symptoms taking metoprolol succinate ER (extended release) 50 mg once a day at night.     You can take an extra dose of metoprolol tartrate 50 mg tablet for breakthrough palpitations.      Your April-May 2022 repeat the 7 day heart rate monitor showed that PVCs were 0.5% of your total heart beats and that you had 5 episodes of 5-20 beats of the ventricular tachycardia which were similar in frequency of extra heart beats and compare to your prior 2019 monitor. Your May 2023 Echocardiogram (ultrasound of the heart) showed normal heart structure and function with no changes from 2017.     Your April 2019 heart rate monitor (WebcollageoPatch) showed only very rare extra heart beats from the ventricles (< 1% total heart beats were PVCs) with 1 episode of 8 consecutive PVCs together. Average resting heart rate was 67 bpm. This monitor supports us continuing your current metoprolol dose.     Your September 2017 coronary CT showed no significant hardening of the heart arteries. We do not need to start a cholesterol medicine at this time. Your January 2023 LDL (bad) cholesterol was 96 and is down from 131 in 2019. Based on your current risk factors, your long term LDL goal is < 100. We do not need to use cholesterol medications at this time.     Your 2023 Echocardiogram (ultrasound of the heart) showed normal heart structure and function with no changes from 2017.     Drink 2 liters of fluids a day to prevent lightheadedness with standing. Drink 16-20 ounces first in the morning. Keep a large glass of water at your bedside.    I ordered a repeat fasting cholesterol blood work test for you to get to follow up on 2023 lab work (lipid panel and lipoprotein A).      Follow up with Dr. Cruz in 6 months.

## 2024-11-09 LAB — LPA SERPL-MCNC: 29 MG/DL

## 2024-12-04 ENCOUNTER — TELEPHONE (OUTPATIENT)
Dept: CARDIOLOGY | Facility: CLINIC | Age: 54
End: 2024-12-04
Payer: COMMERCIAL

## 2024-12-04 NOTE — TELEPHONE ENCOUNTER
Called patient regarding cholesterol results. Per Dr. Cruz patient's LDL and lipoprotein a borderline therefore he would like to start patient on rosuvastatin 5 mg. Patient states he wants to do some research and will message us back about starting the medication.

## 2024-12-05 DIAGNOSIS — E78.5 HYPERLIPIDEMIA, UNSPECIFIED HYPERLIPIDEMIA TYPE: Primary | ICD-10-CM

## 2024-12-05 RX ORDER — ROSUVASTATIN CALCIUM 5 MG/1
5 TABLET, COATED ORAL DAILY
Qty: 90 TABLET | Refills: 3 | Status: SHIPPED | OUTPATIENT
Start: 2024-12-05 | End: 2025-12-05

## 2024-12-05 NOTE — PROGRESS NOTES
Patient agreeable to rosuvastatin. Please sign thanks.    [FreeTextEntry1] : 81yo F w/ afib on Xarelto, HTN, lymphedema referred for gammopathy, r/o amyloidosis. \par We discussed doing a fat pad biopsy for amyloidosis -has an overall sensitivity of 57 to 85 percent and a specificity of 92 to 100 percent for primary (AL) or secondary (AA) amyloidosis. We discussed that pending results of fat pad biopsy and technetium pyrophosphate scan, may need cardiac biopsy if warranted.\par They both verbalized understanding. All questions answered\par RTC after biopsy

## 2024-12-19 ENCOUNTER — APPOINTMENT (OUTPATIENT)
Dept: PRIMARY CARE | Facility: CLINIC | Age: 54
End: 2024-12-19
Payer: COMMERCIAL

## 2025-01-30 ENCOUNTER — APPOINTMENT (OUTPATIENT)
Dept: PRIMARY CARE | Facility: CLINIC | Age: 55
End: 2025-01-30
Payer: COMMERCIAL

## 2025-02-06 DIAGNOSIS — Z12.5 SCREENING FOR PROSTATE CANCER: Primary | ICD-10-CM

## 2025-02-06 DIAGNOSIS — Z00.00 HEALTHCARE MAINTENANCE: ICD-10-CM

## 2025-02-20 LAB
ALBUMIN SERPL-MCNC: 4.6 G/DL (ref 3.6–5.1)
ALP SERPL-CCNC: 65 U/L (ref 35–144)
ALT SERPL-CCNC: 35 U/L (ref 9–46)
ANION GAP SERPL CALCULATED.4IONS-SCNC: 9 MMOL/L (CALC) (ref 7–17)
APPEARANCE UR: CLEAR
AST SERPL-CCNC: 25 U/L (ref 10–35)
BILIRUB SERPL-MCNC: 0.9 MG/DL (ref 0.2–1.2)
BILIRUB UR QL STRIP: NEGATIVE
BUN SERPL-MCNC: 9 MG/DL (ref 7–25)
CALCIUM SERPL-MCNC: 9 MG/DL (ref 8.6–10.3)
CHLORIDE SERPL-SCNC: 107 MMOL/L (ref 98–110)
CHOLEST SERPL-MCNC: 114 MG/DL
CHOLEST/HDLC SERPL: 2.9 (CALC)
CO2 SERPL-SCNC: 24 MMOL/L (ref 20–32)
COLOR UR: YELLOW
CREAT SERPL-MCNC: 0.85 MG/DL (ref 0.7–1.3)
EGFRCR SERPLBLD CKD-EPI 2021: 103 ML/MIN/1.73M2
ERYTHROCYTE [DISTWIDTH] IN BLOOD BY AUTOMATED COUNT: 12.5 % (ref 11–15)
GLUCOSE SERPL-MCNC: 94 MG/DL (ref 65–99)
GLUCOSE UR QL STRIP: NEGATIVE
HCT VFR BLD AUTO: 45.5 % (ref 38.5–50)
HDLC SERPL-MCNC: 40 MG/DL
HGB BLD-MCNC: 15.4 G/DL (ref 13.2–17.1)
HGB UR QL STRIP: NEGATIVE
KETONES UR QL STRIP: NEGATIVE
LDLC SERPL CALC-MCNC: 56 MG/DL (CALC)
LEUKOCYTE ESTERASE UR QL STRIP: NEGATIVE
MCH RBC QN AUTO: 32.7 PG (ref 27–33)
MCHC RBC AUTO-ENTMCNC: 33.8 G/DL (ref 32–36)
MCV RBC AUTO: 96.6 FL (ref 80–100)
NITRITE UR QL STRIP: NEGATIVE
NONHDLC SERPL-MCNC: 74 MG/DL (CALC)
PH UR STRIP: 6 [PH] (ref 5–8)
PLATELET # BLD AUTO: 185 THOUSAND/UL (ref 140–400)
PMV BLD REES-ECKER: 11.5 FL (ref 7.5–12.5)
POTASSIUM SERPL-SCNC: 4.2 MMOL/L (ref 3.5–5.3)
PROT SERPL-MCNC: 7 G/DL (ref 6.1–8.1)
PROT UR QL STRIP: NEGATIVE
PSA SERPL-MCNC: 0.27 NG/ML
RBC # BLD AUTO: 4.71 MILLION/UL (ref 4.2–5.8)
SODIUM SERPL-SCNC: 140 MMOL/L (ref 135–146)
SP GR UR STRIP: 1.01 (ref 1–1.03)
TRIGL SERPL-MCNC: 101 MG/DL
TSH SERPL-ACNC: 3.06 MIU/L (ref 0.4–4.5)
WBC # BLD AUTO: 5 THOUSAND/UL (ref 3.8–10.8)

## 2025-02-21 ASSESSMENT — PROMIS GLOBAL HEALTH SCALE
RATE_QUALITY_OF_LIFE: VERY GOOD
RATE_AVERAGE_PAIN: 2
RATE_PHYSICAL_HEALTH: GOOD
CARRYOUT_PHYSICAL_ACTIVITIES: COMPLETELY
EMOTIONAL_PROBLEMS: SOMETIMES
RATE_MENTAL_HEALTH: GOOD
RATE_SOCIAL_SATISFACTION: GOOD
CARRYOUT_SOCIAL_ACTIVITIES: GOOD
RATE_AVERAGE_FATIGUE: MILD
RATE_GENERAL_HEALTH: GOOD

## 2025-02-28 ENCOUNTER — APPOINTMENT (OUTPATIENT)
Dept: PRIMARY CARE | Facility: CLINIC | Age: 55
End: 2025-02-28
Payer: COMMERCIAL

## 2025-02-28 VITALS
WEIGHT: 222.8 LBS | HEART RATE: 68 BPM | RESPIRATION RATE: 16 BRPM | BODY MASS INDEX: 33 KG/M2 | HEIGHT: 69 IN | SYSTOLIC BLOOD PRESSURE: 112 MMHG | DIASTOLIC BLOOD PRESSURE: 60 MMHG | OXYGEN SATURATION: 97 % | TEMPERATURE: 98 F

## 2025-02-28 DIAGNOSIS — I47.29 VENTRICULAR TACHYCARDIA (PAROXYSMAL) (MULTI): ICD-10-CM

## 2025-02-28 DIAGNOSIS — E78.2 MIXED HYPERLIPIDEMIA: ICD-10-CM

## 2025-02-28 DIAGNOSIS — Z00.00 HEALTHCARE MAINTENANCE: ICD-10-CM

## 2025-02-28 DIAGNOSIS — K76.0 FATTY INFILTRATION OF LIVER: ICD-10-CM

## 2025-02-28 DIAGNOSIS — Z12.11 COLON CANCER SCREENING: Primary | ICD-10-CM

## 2025-02-28 DIAGNOSIS — F32.1 DEPRESSION, MAJOR, SINGLE EPISODE, MODERATE (MULTI): ICD-10-CM

## 2025-02-28 ASSESSMENT — PATIENT HEALTH QUESTIONNAIRE - PHQ9
2. FEELING DOWN, DEPRESSED OR HOPELESS: NOT AT ALL
SUM OF ALL RESPONSES TO PHQ9 QUESTIONS 1 AND 2: 0
1. LITTLE INTEREST OR PLEASURE IN DOING THINGS: NOT AT ALL

## 2025-02-28 NOTE — PROGRESS NOTES
Subjective   Patient ID: Rj Mejia is a 54 y.o. male who presents for Annual Exam (Pt is here due to annual physical).    HPI     Patient has been feeling pretty good and has been complaint with prescribed medications.    We reviewed and discussed details of recent blood work: CBC, CMP, TSH, Lipid panel, PSA done in Feb 2025.  Results within acceptable range.    Colonoscopy: 2021, next advised in 2024    Patient has been following with cardiologist regarding palpitations. Underwent extensive work up, diagnosed with Paroxysmal Ventricular tachycardia, advised Metoprolol.     Recently started on statin, tolerating well, lipid panel improved.       He has been eating healthy and exercising daily.  Has difficulties losing weight.    Patient had CT angio in 2017 which did not show obstructive CAD,, small pulmonary nodule was noted in RUL. He never smoked.         There is fam hx of heart disease: father.  His stress test was normal in 2015.   There is no family hx of colon or prostate cancer        Previous US showed fatty infiltration of liver.      Underwent colonoscopy in March 2021, 3 polyps removed (Dr. Schmidt), next colonoscopy was advised in 2024.  So far not done.    Anxiety/depression sx controlled with Effexor. We discussed possible counseling/psychiatry consult if needed.        Review of Systems   Constitutional: Negative.  Negative for activity change, appetite change and unexpected weight change.   HENT: Negative.  Negative for congestion, ear discharge, ear pain, hearing loss, mouth sores, nosebleeds, sinus pressure, sinus pain, sore throat, trouble swallowing and voice change.    Eyes: Negative.  Negative for pain, discharge, redness and visual disturbance.   Respiratory: Negative.  Negative for cough, chest tightness, shortness of breath, wheezing and stridor.    Cardiovascular: Negative.  Negative for chest pain, palpitations and leg swelling.   Gastrointestinal: Negative.  Negative for abdominal  "pain, blood in stool, constipation, diarrhea, nausea and vomiting.   Endocrine: Negative.  Negative for polydipsia, polyphagia and polyuria.   Genitourinary: Negative.  Negative for difficulty urinating, dysuria, flank pain, frequency and urgency.   Musculoskeletal: Negative.  Negative for arthralgias, back pain, gait problem, joint swelling, myalgias, neck pain and neck stiffness.   Skin: Negative.  Negative for color change, rash and wound.   Allergic/Immunologic: Negative.  Negative for environmental allergies, food allergies and immunocompromised state.   Neurological: Negative.  Negative for dizziness, tremors, seizures, syncope, facial asymmetry, speech difficulty, weakness, light-headedness, numbness and headaches.   Hematological: Negative.  Negative for adenopathy. Does not bruise/bleed easily.   Psychiatric/Behavioral: Negative.  Negative for agitation, behavioral problems, confusion, hallucinations, sleep disturbance and suicidal ideas. The patient is not nervous/anxious.    All other systems reviewed and are negative.      Objective   /60 (BP Location: Left arm, Patient Position: Sitting, BP Cuff Size: Adult)   Pulse 68   Temp 36.7 °C (98 °F) (Temporal)   Resp 16   Ht 1.753 m (5' 9\")   Wt 101 kg (222 lb 12.8 oz)   SpO2 97%   BMI 32.90 kg/m²     Physical Exam  Vitals and nursing note reviewed.   Constitutional:       General: He is not in acute distress.     Appearance: Normal appearance.   HENT:      Head: Normocephalic and atraumatic.      Right Ear: Tympanic membrane, ear canal and external ear normal.      Left Ear: Tympanic membrane, ear canal and external ear normal.      Nose: Nose normal. No congestion or rhinorrhea.      Mouth/Throat:      Mouth: Mucous membranes are moist.      Pharynx: Oropharynx is clear.   Eyes:      General:         Right eye: No discharge.         Left eye: No discharge.      Extraocular Movements: Extraocular movements intact.      Conjunctiva/sclera: " Conjunctivae normal.      Pupils: Pupils are equal, round, and reactive to light.   Cardiovascular:      Rate and Rhythm: Normal rate and regular rhythm.      Pulses: Normal pulses.      Heart sounds: Normal heart sounds. No murmur heard.     No friction rub. No gallop.   Pulmonary:      Effort: Pulmonary effort is normal. No respiratory distress.      Breath sounds: Normal breath sounds. No stridor. No wheezing, rhonchi or rales.   Chest:      Chest wall: No tenderness.   Abdominal:      General: Bowel sounds are normal.      Palpations: Abdomen is soft. There is no mass.      Tenderness: There is no abdominal tenderness. There is no guarding or rebound.   Musculoskeletal:         General: No swelling or deformity. Normal range of motion.      Cervical back: Normal range of motion and neck supple. No rigidity or tenderness.      Right lower leg: No edema.      Left lower leg: No edema.   Lymphadenopathy:      Cervical: No cervical adenopathy.   Skin:     General: Skin is warm and dry.      Coloration: Skin is not jaundiced.      Findings: No bruising or erythema.   Neurological:      General: No focal deficit present.      Mental Status: He is alert and oriented to person, place, and time. Mental status is at baseline.      Cranial Nerves: No cranial nerve deficit.      Motor: No weakness.      Coordination: Coordination normal.      Gait: Gait normal.   Psychiatric:         Mood and Affect: Mood normal.         Behavior: Behavior normal.         Thought Content: Thought content normal.         Judgment: Judgment normal.         Assessment/Plan   Problem List Items Addressed This Visit             ICD-10-CM       Cardiac and Vasculature    Ventricular tachycardia (paroxysmal) (Multi) I47.29     Stable. F/up with cardiology.         Mixed hyperlipidemia E78.2     Continue statin.            Gastrointestinal and Abdominal    Fatty infiltration of liver K76.0     Low cholesterol and low carbohydrate diet is advised.              Health Encounters    Healthcare maintenance Z00.00       Mental Health    Depression, major, single episode, moderate (Multi) F32.1     Stable. Continue current treatment.          Other Visit Diagnoses         Codes    Colon cancer screening    -  Primary Z12.11    Relevant Orders    Colonoscopy Screening; Average Risk Patient          It was a pleasure to see you today.  I would like to remind you about importance of a healthy lifestyle in order to improve your well-being and live longer.  Try to engage in physical activities for at least 150 minutes per week.  Eat about 10 servings of fruits and vegetables daily. My advice is 2 servings of fruits and 8 servings of vegetables.  For vegetables choose at least half of them green and at least half of them fresh.  Please avoid sugar, salt, fried food and saturated fat.      F/up in 1 year or sooner if needed.

## 2025-03-01 PROBLEM — E78.2 MIXED HYPERLIPIDEMIA: Status: ACTIVE | Noted: 2025-03-01

## 2025-03-01 ASSESSMENT — ENCOUNTER SYMPTOMS
DIARRHEA: 0
ALLERGIC/IMMUNOLOGIC NEGATIVE: 1
DIFFICULTY URINATING: 0
FREQUENCY: 0
WHEEZING: 0
BACK PAIN: 0
HEMATOLOGIC/LYMPHATIC NEGATIVE: 1
DIZZINESS: 0
MYALGIAS: 0
STRIDOR: 0
WOUND: 0
CONSTIPATION: 0
HEADACHES: 0
POLYDIPSIA: 0
POLYPHAGIA: 0
SPEECH DIFFICULTY: 0
MUSCULOSKELETAL NEGATIVE: 1
ARTHRALGIAS: 0
CHEST TIGHTNESS: 0
VOMITING: 0
EYE PAIN: 0
AGITATION: 0
CARDIOVASCULAR NEGATIVE: 1
EYE REDNESS: 0
SHORTNESS OF BREATH: 0
CONFUSION: 0
CONSTITUTIONAL NEGATIVE: 1
BRUISES/BLEEDS EASILY: 0
WEAKNESS: 0
COUGH: 0
NEUROLOGICAL NEGATIVE: 1
TROUBLE SWALLOWING: 0
NECK PAIN: 0
FACIAL ASYMMETRY: 0
COLOR CHANGE: 0
NAUSEA: 0
EYES NEGATIVE: 1
TREMORS: 0
SINUS PRESSURE: 0
LIGHT-HEADEDNESS: 0
FLANK PAIN: 0
NECK STIFFNESS: 0
GASTROINTESTINAL NEGATIVE: 1
ADENOPATHY: 0
UNEXPECTED WEIGHT CHANGE: 0
SORE THROAT: 0
SEIZURES: 0
ENDOCRINE NEGATIVE: 1
PSYCHIATRIC NEGATIVE: 1
EYE DISCHARGE: 0
NUMBNESS: 0
ACTIVITY CHANGE: 0
SINUS PAIN: 0
JOINT SWELLING: 0
ABDOMINAL PAIN: 0
RESPIRATORY NEGATIVE: 1
HALLUCINATIONS: 0
NERVOUS/ANXIOUS: 0
APPETITE CHANGE: 0
SLEEP DISTURBANCE: 0
BLOOD IN STOOL: 0
DYSURIA: 0
VOICE CHANGE: 0
PALPITATIONS: 0

## 2025-04-21 DIAGNOSIS — F32.1 MAJOR DEPRESSIVE DISORDER, SINGLE EPISODE, MODERATE (MULTI): ICD-10-CM

## 2025-04-21 RX ORDER — VENLAFAXINE HYDROCHLORIDE 75 MG/1
75 CAPSULE, EXTENDED RELEASE ORAL DAILY
Qty: 90 CAPSULE | Refills: 3 | Status: SHIPPED | OUTPATIENT
Start: 2025-04-21

## 2025-05-01 ENCOUNTER — APPOINTMENT (OUTPATIENT)
Dept: OPERATING ROOM | Facility: CLINIC | Age: 55
End: 2025-05-01
Payer: COMMERCIAL

## 2025-05-14 ENCOUNTER — APPOINTMENT (OUTPATIENT)
Dept: CARDIOLOGY | Facility: CLINIC | Age: 55
End: 2025-05-14
Payer: COMMERCIAL

## 2025-05-21 ENCOUNTER — OFFICE VISIT (OUTPATIENT)
Dept: CARDIOLOGY | Facility: CLINIC | Age: 55
End: 2025-05-21
Payer: COMMERCIAL

## 2025-05-21 VITALS
BODY MASS INDEX: 32.5 KG/M2 | SYSTOLIC BLOOD PRESSURE: 112 MMHG | DIASTOLIC BLOOD PRESSURE: 77 MMHG | HEIGHT: 69 IN | OXYGEN SATURATION: 95 % | WEIGHT: 219.4 LBS | HEART RATE: 65 BPM

## 2025-05-21 DIAGNOSIS — I51.89 FAMILIAL HEART DISEASE: Primary | ICD-10-CM

## 2025-05-21 DIAGNOSIS — I47.20 VENTRICULAR TACHYCARDIA (PAROXYSMAL): ICD-10-CM

## 2025-05-21 DIAGNOSIS — R00.2 PALPITATIONS: ICD-10-CM

## 2025-05-21 DIAGNOSIS — E78.2 MIXED HYPERLIPIDEMIA: ICD-10-CM

## 2025-05-21 DIAGNOSIS — I25.10 CORONARY ARTERIOSCLEROSIS: ICD-10-CM

## 2025-05-21 PROCEDURE — 99214 OFFICE O/P EST MOD 30 MIN: CPT | Performed by: INTERNAL MEDICINE

## 2025-05-21 PROCEDURE — 3008F BODY MASS INDEX DOCD: CPT | Performed by: INTERNAL MEDICINE

## 2025-05-21 PROCEDURE — 93005 ELECTROCARDIOGRAM TRACING: CPT | Performed by: INTERNAL MEDICINE

## 2025-05-21 PROCEDURE — 1036F TOBACCO NON-USER: CPT | Performed by: INTERNAL MEDICINE

## 2025-05-21 RX ORDER — METOPROLOL TARTRATE 50 MG/1
50 TABLET ORAL 2 TIMES DAILY
Qty: 270 TABLET | Refills: 3 | Status: SHIPPED | OUTPATIENT
Start: 2025-05-21 | End: 2026-05-21

## 2025-05-21 ASSESSMENT — ENCOUNTER SYMPTOMS
LOSS OF SENSATION IN FEET: 0
DEPRESSION: 0
OCCASIONAL FEELINGS OF UNSTEADINESS: 0

## 2025-05-21 ASSESSMENT — PATIENT HEALTH QUESTIONNAIRE - PHQ9
SUM OF ALL RESPONSES TO PHQ9 QUESTIONS 1 AND 2: 0
1. LITTLE INTEREST OR PLEASURE IN DOING THINGS: NOT AT ALL
2. FEELING DOWN, DEPRESSED OR HOPELESS: NOT AT ALL

## 2025-05-21 ASSESSMENT — PAIN SCALES - GENERAL: PAINLEVEL_OUTOF10: 0-NO PAIN

## 2025-05-21 NOTE — PROGRESS NOTES
Subjective   Rj Mejia was last seen in the [facility] for follow up of palpitations and paroxysmal ventricular tachycardia in November 2024.      Since our last visit, Mr. Mejia has less frequent episodes of palpitations after increasing aerobic exercise program. He is taking back metoprolol tartrate 50 mg twice a day. And has not had to use extra 50 mg tablets. Short episodes a few days a week at night.     Started rosuvastatin 5 mg a day in late 2024 for elevated LDL > 100.     We tried metoprolol ER but this had more fatigue and day time palpitations episodes prompting switch back to shorter acting metoprolol since our May 2024 visit.    Rare episodes of palpitations that last for 1-5 seconds from paroxysmal VT episodes about 1 per 2 months. Prior use of metoprolol  mg doses caused notable dyspnea. Notes more day time fatigue now and decreased exercise schedule.     Prior symptom baseline at 4/2021 visit was symptoms 3-4 times a week. No lightheadedness or presyncope. No chest pain, PND, orthopnea, DORENE, claudication. Some episodes of dyspnea with walking up the stairs. Less aerobic exercise frequency in the last 6 months. Has not had episodes of orthostatic lightheadedness with standing in the last 6 months with more oral hydration.     He was hospitalized at [facility] 9/19-9/22/2017 for finding of sustained ventricular tachycardia and 6 second sinus pause on ZIoPatch monitor. Structural heart reason for VT excluded by CMRI, CCTA.      Past Medical History:  1. Depression on SSRI  2. Palpitations / paroxysmal VT     Family History:  Father had CABG in his 60s and AVR for bicuspid AV     Social History:  Non-smoker     Review of Systems    A 14 point review of systems was asked. All questions were negative except for pertinent positives listed in the HPI.     Current Outpatient Medications on File Prior to Visit   Medication Sig Dispense Refill    metoprolol tartrate  "(Lopressor) 50 mg tablet Take 1 tablet by mouth 2 times a day. You may also take an additional 50 mg tablet dose as needed for breakthrough palpitation episode. 270 tablet 3    rosuvastatin (Crestor) 5 mg tablet Take 1 tablet (5 mg) by mouth once daily. 90 tablet 3    venlafaxine XR (Effexor-XR) 75 mg 24 hr capsule TAKE 1 CAPSULE BY MOUTH EVERY DAY 90 capsule 3     No current facility-administered medications on file prior to visit.      Objective   Physical Exam  BP Readings from Last 3 Encounters:   05/21/25 112/77   02/28/25 112/60   11/06/24 111/79      Wt Readings from Last 3 Encounters:   05/21/25 99.5 kg (219 lb 6.4 oz)   02/28/25 101 kg (222 lb 12.8 oz)   11/06/24 99.3 kg (219 lb)      BMI: Estimated body mass index is 32.4 kg/m² as calculated from the following:    Height as of this encounter: 1.753 m (5' 9\").    Weight as of this encounter: 99.5 kg (219 lb 6.4 oz).  BSA: Estimated body surface area is 2.2 meters squared as calculated from the following:    Height as of this encounter: 1.753 m (5' 9\").    Weight as of this encounter: 99.5 kg (219 lb 6.4 oz).    General: no acute distress  HEENT: EOMI, no scleral icterus.  Lungs: Clear to auscultation bilaterally without wheezing, rales, or rhonchi.  Cardiovascular: Regular rhythm and rate. Normal S1 and S2. No murmurs, rubs, or gallops are appreciated. JVP normal.  Abdomen: Soft, nontender, nondistended. Bowel sounds present.  Extremities: Warm and well perfused with equal 2+ pulses bilaterally.  No edema present.  Neurologic: Alert and oriented x3.    I have personally reviewed the following images and laboratory findings:  Last echocardiogram:   5/16/2023 echo: LV 65-70%, LV conc remodeling (LVMI 83 gm/m2), impaired relaxation diastology (E/e' 6), normal LA size (SPENCER 21 ml/m2), normal RV/RA, trivial AI, trace MR, trace TR, RVSP 20 mm Hg (RAP 3 mm Hg).    Prior echo, 9/20/2017: LV EF 65-70%, no LVH, impaired relaxation diastology (E/e' 4), normal LA (SPENCER " "20), normal RV/RA, no AI, mild MAC, trace MR, trace TR, unable to estimate RVSP.     Cardiac MRI, 2017: LV EF 55-60%, normal RV function and morphology with LGE enhancement or fatty infiltration. Normal LV ECV and no LGE.     Last cath / stress test:   Coronary CTA 2017: LMCA: nl, LAD: focal mid mild plaque, LCX: nl, RI: nl, RCA: dominant, normal.    Most recent EC2025 ECG: Sinus rhythm, 62 bpm, normal ECG. Personally reviewed in office.    2024 ECG: Sinus rhythm, 63 bpm, normal ECG. Personally reviewed in office.    2023 ECG: Sinus bradycardia, 58 bpm, borderline LVH pattern. Normal variant ECG. Personally reviewed in office.    Lab Results   Component Value Date    CHOL 114 2025    CHOL 199 2024    CHOL 170 2023     Lab Results   Component Value Date    HDL 40 2025    HDL 39.9 2024    HDL 38.0 (A) 2023     Lab Results   Component Value Date    LDLCALC 56 2025    LDLCALC 112 (H) 2024     Lab Results   Component Value Date    TRIG 101 2025    TRIG 234 (H) 2024    TRIG 182 (H) 2023     No components found for: \"CHOLHDL\"   2024 LpA 29     Assessment/Plan   1. Palpitations:  Due to PVCs and rare paroxysmal VT without evidence of structural heart disease on detail evaluation in .  repeat 7 day HR monitor showed stable PVC burden of 0.5% with rate short NSVT episodes controlled on beta-blocker therapy.     For fatigue / exertional dyspnea episodes and ongoing palpitations from PVCs/paroxysmal VT, repeat echocardiogram in May 2023 showed no cardiac structural changes and was unchanged from the 2017 study.     Continue metoprolol tartrate 50 mg twice a day. We attempted to  switch to metoprolol succinate extended release 50 mg tablets once a day in spring 2024 but Mr. Mejia had both more fatigue symptoms and more notable palpitation episodes.     He can take an extra metoprolol tartrate 50 mg tablet PRN for " breakthrough palpitations. Symptoms are improved with aerobic exercise training.    2. Family History of CAD:  Low CACS (29), and patent coronary arteries on 9/21/2017 CCTA. November 2024 LDL was 112. Down to 56 taking rosuvastatin 5 mg a day. stable at 96. Long term LDL goal < 100 based on current risk factor profile.    Continue rosuvastatin 5 mg a day. CAD risk factors at goal to limit progression of coronary arteriosclerosis.    Follow up with Dr. Cruz in 6 months.            SIGNATURE: Wallace Cruz MD PATIENT NAME: Rj Mejia   DATE/TIME: May 21, 2025 8:36 AM MRN: [id number]

## 2025-05-21 NOTE — PATIENT INSTRUCTIONS
You were seen in the [facility] for your palpitations and ventricular tachycardia.     We switched back to metoprolol tartrate 50 mg tablet twice a day as you had more symptoms taking metoprolol succinate ER (extended release) 50 mg once a day at night.     You can take an extra dose of metoprolol tartrate 50 mg tablet for breakthrough palpitations.      Your April-May 2022 repeat the 7 day heart rate monitor showed that PVCs were 0.5% of your total heart beats and that you had 5 episodes of 5-20 beats of the ventricular tachycardia which were similar in frequency of extra heart beats and compare to your prior 2019 monitor. Your May 2023 Echocardiogram (ultrasound of the heart) showed normal heart structure and function with no changes from 2017.     Your April 2019 heart rate monitor (CinexiooPatch) showed only very rare extra heart beats from the ventricles (< 1% total heart beats were PVCs) with 1 episode of 8 consecutive PVCs together. Average resting heart rate was 67 bpm. This monitor supports us continuing your current metoprolol dose.     Your September 2017 coronary CT showed no significant hardening of the heart arteries. Your February 2025 cholesterol lab work showed a 50% drop in LDL (bad) cholesterol to 56 from 112 taking rosuvastatin 5 mg a day. Your lipoprotein A level was 29 (normal range) in November 2024 telling me that you do not have this extra genetic risk for heart disease.     Your 2023 Echocardiogram (ultrasound of the heart) showed normal heart structure and function with no changes from 2017.     Drink 2 liters of fluids a day to prevent lightheadedness with standing. Drink 16-20 ounces first in the morning. Keep a large glass of water at your bedside.    Keep up with your exercise program. Aerobic exercise is protecting your heart arteries, lowering your cholesterol levels, and reducing palpitations.    Follow up with Dr. Cruz in 6 months.

## 2025-05-22 LAB
ATRIAL RATE: 62 BPM
P AXIS: 37 DEGREES
P OFFSET: 197 MS
P ONSET: 134 MS
PR INTERVAL: 184 MS
Q ONSET: 226 MS
QRS COUNT: 10 BEATS
QRS DURATION: 86 MS
QT INTERVAL: 412 MS
QTC CALCULATION(BAZETT): 418 MS
QTC FREDERICIA: 416 MS
R AXIS: -14 DEGREES
T AXIS: -2 DEGREES
T OFFSET: 432 MS
VENTRICULAR RATE: 62 BPM

## 2025-07-10 ENCOUNTER — ANESTHESIA (OUTPATIENT)
Dept: OPERATING ROOM | Facility: CLINIC | Age: 55
End: 2025-07-10
Payer: COMMERCIAL

## 2025-07-10 ENCOUNTER — HOSPITAL ENCOUNTER (OUTPATIENT)
Dept: OPERATING ROOM | Facility: CLINIC | Age: 55
Setting detail: OUTPATIENT SURGERY
Discharge: HOME | End: 2025-07-10
Payer: COMMERCIAL

## 2025-07-10 ENCOUNTER — ANESTHESIA EVENT (OUTPATIENT)
Dept: OPERATING ROOM | Facility: CLINIC | Age: 55
End: 2025-07-10
Payer: COMMERCIAL

## 2025-07-10 VITALS
OXYGEN SATURATION: 99 % | HEART RATE: 62 BPM | HEIGHT: 70 IN | WEIGHT: 220.02 LBS | DIASTOLIC BLOOD PRESSURE: 56 MMHG | BODY MASS INDEX: 31.5 KG/M2 | TEMPERATURE: 96.8 F | RESPIRATION RATE: 16 BRPM | SYSTOLIC BLOOD PRESSURE: 110 MMHG

## 2025-07-10 DIAGNOSIS — Z12.11 COLON CANCER SCREENING: ICD-10-CM

## 2025-07-10 PROCEDURE — 3700000002 HC GENERAL ANESTHESIA TIME - EACH INCREMENTAL 1 MINUTE: Performed by: ANESTHESIOLOGY

## 2025-07-10 PROCEDURE — 2500000004 HC RX 250 GENERAL PHARMACY W/ HCPCS (ALT 636 FOR OP/ED)

## 2025-07-10 PROCEDURE — 7100000010 HC PHASE TWO TIME - EACH INCREMENTAL 1 MINUTE: Performed by: ANESTHESIOLOGY

## 2025-07-10 PROCEDURE — 3700000001 HC GENERAL ANESTHESIA TIME - INITIAL BASE CHARGE: Performed by: ANESTHESIOLOGY

## 2025-07-10 PROCEDURE — 3600000007 HC OR TIME - EACH INCREMENTAL 1 MINUTE - PROCEDURE LEVEL TWO: Performed by: ANESTHESIOLOGY

## 2025-07-10 PROCEDURE — A45380 PR COLONOSCOPY,BIOPSY

## 2025-07-10 PROCEDURE — 3600000002 HC OR TIME - INITIAL BASE CHARGE - PROCEDURE LEVEL TWO: Performed by: ANESTHESIOLOGY

## 2025-07-10 PROCEDURE — A45380 PR COLONOSCOPY,BIOPSY: Performed by: ANESTHESIOLOGY

## 2025-07-10 PROCEDURE — 7100000009 HC PHASE TWO TIME - INITIAL BASE CHARGE: Performed by: ANESTHESIOLOGY

## 2025-07-10 RX ORDER — SODIUM CHLORIDE, SODIUM LACTATE, POTASSIUM CHLORIDE, CALCIUM CHLORIDE 600; 310; 30; 20 MG/100ML; MG/100ML; MG/100ML; MG/100ML
100 INJECTION, SOLUTION INTRAVENOUS CONTINUOUS
OUTPATIENT
Start: 2025-07-10 | End: 2025-07-10

## 2025-07-10 RX ORDER — PROPOFOL 10 MG/ML
INJECTION, EMULSION INTRAVENOUS AS NEEDED
Status: DISCONTINUED | OUTPATIENT
Start: 2025-07-10 | End: 2025-07-10

## 2025-07-10 RX ORDER — ONDANSETRON HYDROCHLORIDE 2 MG/ML
4 INJECTION, SOLUTION INTRAVENOUS ONCE AS NEEDED
OUTPATIENT
Start: 2025-07-10

## 2025-07-10 RX ORDER — LIDOCAINE HCL/PF 100 MG/5ML
SYRINGE (ML) INTRAVENOUS AS NEEDED
Status: DISCONTINUED | OUTPATIENT
Start: 2025-07-10 | End: 2025-07-10

## 2025-07-10 RX ORDER — SODIUM CHLORIDE, SODIUM LACTATE, POTASSIUM CHLORIDE, CALCIUM CHLORIDE 600; 310; 30; 20 MG/100ML; MG/100ML; MG/100ML; MG/100ML
INJECTION, SOLUTION INTRAVENOUS CONTINUOUS PRN
Status: DISCONTINUED | OUTPATIENT
Start: 2025-07-10 | End: 2025-07-10

## 2025-07-10 RX ORDER — LIDOCAINE HYDROCHLORIDE 10 MG/ML
0.1 INJECTION, SOLUTION EPIDURAL; INFILTRATION; INTRACAUDAL; PERINEURAL ONCE
OUTPATIENT
Start: 2025-07-10 | End: 2025-07-10

## 2025-07-10 RX ADMIN — PROPOFOL 200 MCG/KG/MIN: 10 INJECTION, EMULSION INTRAVENOUS at 09:34

## 2025-07-10 RX ADMIN — PROPOFOL 40 MG: 10 INJECTION, EMULSION INTRAVENOUS at 09:35

## 2025-07-10 RX ADMIN — PROPOFOL 20 MG: 10 INJECTION, EMULSION INTRAVENOUS at 09:38

## 2025-07-10 RX ADMIN — LIDOCAINE HYDROCHLORIDE 100 MG: 20 INJECTION INTRAVENOUS at 09:32

## 2025-07-10 RX ADMIN — SODIUM CHLORIDE, POTASSIUM CHLORIDE, SODIUM LACTATE AND CALCIUM CHLORIDE: 600; 310; 30; 20 INJECTION, SOLUTION INTRAVENOUS at 09:32

## 2025-07-10 SDOH — HEALTH STABILITY: MENTAL HEALTH: CURRENT SMOKER: 0

## 2025-07-10 ASSESSMENT — PAIN - FUNCTIONAL ASSESSMENT
PAIN_FUNCTIONAL_ASSESSMENT: 0-10

## 2025-07-10 ASSESSMENT — COLUMBIA-SUICIDE SEVERITY RATING SCALE - C-SSRS
6. HAVE YOU EVER DONE ANYTHING, STARTED TO DO ANYTHING, OR PREPARED TO DO ANYTHING TO END YOUR LIFE?: NO
1. IN THE PAST MONTH, HAVE YOU WISHED YOU WERE DEAD OR WISHED YOU COULD GO TO SLEEP AND NOT WAKE UP?: NO
2. HAVE YOU ACTUALLY HAD ANY THOUGHTS OF KILLING YOURSELF?: NO

## 2025-07-10 ASSESSMENT — PAIN SCALES - GENERAL
PAINLEVEL_OUTOF10: 0 - NO PAIN

## 2025-07-10 NOTE — ANESTHESIA POSTPROCEDURE EVALUATION
Patient: Rj Mejia    Procedure Summary       Date: 07/10/25 Room / Location: [facility] OR    Anesthesia Start: 0932 Anesthesia Stop: 1047    Procedure: COLONOSCOPY Diagnosis: Colon cancer screening    Scheduled Providers: Brandyn Stallings MD Responsible Provider: David Christian MD    Anesthesia Type: MAC ASA Status: 2            Anesthesia Type: MAC    Vitals Value Taken Time   /56 07/10/25 11:15   Temp 36 °C (96.8 °F) 07/10/25 11:15   Pulse 62 07/10/25 11:15   Resp 16 07/10/25 11:15   SpO2 99 % 07/10/25 11:15       Anesthesia Post Evaluation    Patient location during evaluation: PACU  Patient participation: complete - patient participated  Level of consciousness: awake  Pain management: satisfactory to patient  Multimodal analgesia pain management approach  Airway patency: patent  Cardiovascular status: acceptable  Respiratory status: acceptable  Hydration status: acceptable  Postoperative Nausea and Vomiting: none  Comments: Did well        No notable events documented.

## 2025-07-10 NOTE — DISCHARGE INSTRUCTIONS
During the first 24 hours after your procedure, you should:    - Resume normal diet, unless otherwise directed by your doctor.  - Resume your home medications, unless otherwise directed by your doctor.  - Refrain from driving or operative heavy machinery.  - Drink plenty of liquids.  - Avoid consuming alcohol.  - Avoid strenuous activity or heavy lifting.    After 24 hours, you can resume regular activity.    Call your doctor office immediately ([phone]) or come to the nearest emergency room if you experience:    - Abdominal tenderness  - Blood in your stool or vomit  - Difficulty urinating or passing stools  - Difficulty breathing  - Chest pain  - Fever       If you experience any problems or have any questions following discharge from the GI Lab, please call:   Dr. Brandyn Stallings [phone]  To reach your physician after hours call [phone] and ask for the GI physician on call.

## 2025-07-10 NOTE — H&P
"History Of Present Illness  Rj Mejia is a 55 y.o. male presenting for routine colonoscopy.  Past medical history of hypertension, paroxysmal V. tach (on metoprolol), pulmonary nodule.  No prior abdominal surgical history.  Last colonoscopy 2021, findings copied below.  Not on any blood thinners, last meal yesterday, finish complete prep.  Reports paroxysmal V. tach started \"over 10 years ago\" and that it is normal for him to have palpitations intermittently, denies any symptomatology associated with this.  Notes that it normally happens at rest.  No bleeding per rectum, personal or familial history of cancer.     Past Medical History  Medical History[1]    Surgical History  Surgical History[2]     Social History  He reports that he has never smoked. He has never used smokeless tobacco. He reports current alcohol use of about 1.0 standard drink of alcohol per week. He reports that he does not use drugs.    Family History  Family History[3]     Allergies  Patient has no known allergies.    Review of Systems  A full ROS was performed and unremarkable unless documented in the HPI.  Physical Exam  Vitals reviewed.   Constitutional:       General: He is not in acute distress.     Appearance: Normal appearance. He is not ill-appearing, toxic-appearing or diaphoretic.   HENT:      Head: Normocephalic and atraumatic.   Eyes:      General: No scleral icterus.        Right eye: No discharge.         Left eye: No discharge.   Cardiovascular:      Rate and Rhythm: Normal rate.   Pulmonary:      Effort: Pulmonary effort is normal. No respiratory distress.      Breath sounds: Normal breath sounds.   Abdominal:      General: Abdomen is flat. There is no distension.      Palpations: Abdomen is soft. There is no mass.      Tenderness: There is no abdominal tenderness. There is no guarding or rebound.   Musculoskeletal:         General: No deformity.      Cervical back: Normal range of motion.   Skin:     General: Skin is warm " "and dry.      Coloration: Skin is not jaundiced.   Neurological:      General: No focal deficit present.      Mental Status: He is alert and oriented to person, place, and time. Mental status is at baseline.   Psychiatric:         Mood and Affect: Mood normal.         Behavior: Behavior normal.          Last Recorded Vitals  Blood pressure 127/77, pulse 71, temperature 36 °C (96.8 °F), temperature source Temporal, resp. rate 16, height 1.778 m (5' 10\"), weight 99.8 kg (220 lb 0.3 oz), SpO2 96%.    Relevant Results  2021   - One 10 mm polyp in the ascending colon, removed with  a cold snare. Resected and retrieved.  - Four 3 to 8 mm polyps in the sigmoid colon and in the ascending colon, removed with a cold snare.     Resected and retrieved.  - Internal hemorrhoids.       Assessment & Plan  Colon cancer screening      55-year-old male with past medical history of hypertension, paroxysmal V. tach on metoprolol, pulmonary nodule here for repeat colonoscopy after 2021 colonoscopy notable for tubulovillous adenomas.  Okay to proceed with colonoscopy today.    Seen and d/w Dr. Chandrakant Giraldo MD         [1]   Past Medical History:  Diagnosis Date    Other specified anxiety disorders     Depression with anxiety    Personal history of other mental and behavioral disorders     History of attention deficit disorder   [2]   Past Surgical History:  Procedure Laterality Date    CT ANGIO CORONARY ART WITH HEARTFLOW IF SCORE >30%  9/21/2017    CT HEART CORONARY ANGIOGRAM 9/21/2017 [facility] [place] [facility]   [3]   Family History  Problem Relation Name Age of Onset    Other (cardiac disorder) Father      Diabetes Father      Depression Other Unknown     Diabetes insipidus Other Unknown     Other (arteriosclerotic cardiovascular disease) Other Unknown      "

## 2025-07-10 NOTE — ANESTHESIA PREPROCEDURE EVALUATION
Patient: Rj Mejia    Procedure Information       Date/Time: 07/10/25 0930    Scheduled providers: Brandyn Stallings MD    Procedure: COLONOSCOPY    Location: [facility] OR            Relevant Problems   Cardiac   (+) Atypical chest pain   (+) Coronary arteriosclerosis   (+) Mixed hyperlipidemia   (+) Ventricular tachycardia (paroxysmal)      Neuro   (+) Anxiety   (+) Depression   (+) Depression, major, single episode, moderate (Multi)   (+) Generalized anxiety disorder      GI   (+) GERD (gastroesophageal reflux disease)      Liver   (+) Fatty infiltration of liver      ID   (+) COVID-19   (+) COVID-19 virus infection       Clinical information reviewed:   Tobacco  Allergies  Meds   Med Hx  Surg Hx   Fam Hx  Soc Hx        NPO Detail:  NPO/Void Status  Date of Last Liquid: 07/09/25  Time of Last Liquid: 2200  Date of Last Solid: 07/09/25  Time of Last Solid: 0800  Last Intake Type: Light meal; Clear fluids  Time of Last Void: 0841         Physical Exam    Airway  Mallampati: III  TM distance: >3 FB  Neck ROM: full  Mouth opening: 3 or more finger widths     Cardiovascular   Rhythm: regular  Rate: normal     Dental - normal exam     Pulmonary    Abdominal            Anesthesia Plan    History of general anesthesia?: yes  History of complications of general anesthesia?: no    ASA 2     MAC     The patient is not a current smoker.    intravenous induction   Anesthetic plan and risks discussed with patient.    Plan discussed with [facility].

## 2025-07-18 LAB
LABORATORY COMMENT REPORT: NORMAL
PATH REPORT.FINAL DX SPEC: NORMAL
PATH REPORT.GROSS SPEC: NORMAL
PATH REPORT.TOTAL CANCER: NORMAL

## 2025-08-14 ENCOUNTER — RESULTS FOLLOW-UP (OUTPATIENT)
Dept: SURGERY | Facility: HOSPITAL | Age: 55
End: 2025-08-14
Payer: COMMERCIAL

## 2025-08-24 DIAGNOSIS — R00.2 PALPITATIONS: ICD-10-CM

## 2025-08-24 DIAGNOSIS — I47.20 VENTRICULAR TACHYCARDIA (PAROXYSMAL): ICD-10-CM

## 2025-08-25 RX ORDER — METOPROLOL TARTRATE 50 MG/1
TABLET ORAL
Qty: 270 TABLET | Refills: 3 | Status: SHIPPED | OUTPATIENT
Start: 2025-08-25

## 2025-11-19 ENCOUNTER — APPOINTMENT (OUTPATIENT)
Dept: CARDIOLOGY | Facility: CLINIC | Age: 55
End: 2025-11-19
Payer: COMMERCIAL

## 2026-03-02 ENCOUNTER — APPOINTMENT (OUTPATIENT)
Dept: PRIMARY CARE | Facility: CLINIC | Age: 56
End: 2026-03-02
Payer: COMMERCIAL